# Patient Record
Sex: FEMALE | Race: WHITE | NOT HISPANIC OR LATINO | Employment: OTHER | URBAN - METROPOLITAN AREA
[De-identification: names, ages, dates, MRNs, and addresses within clinical notes are randomized per-mention and may not be internally consistent; named-entity substitution may affect disease eponyms.]

---

## 2024-08-12 ENCOUNTER — HOSPITAL ENCOUNTER (OUTPATIENT)
Dept: CARDIOLOGY | Facility: CLINIC | Age: 79
Discharge: HOME | End: 2024-08-12
Attending: FAMILY MEDICINE | Admitting: FAMILY MEDICINE
Payer: MEDICARE

## 2024-08-12 DIAGNOSIS — I51.89 DIASTOLIC DYSFUNCTION: ICD-10-CM

## 2024-08-12 DIAGNOSIS — I11.9 HYPERTENSIVE HEART DISEASE: ICD-10-CM

## 2024-08-12 LAB — LVEF ECHO: NORMAL

## 2024-08-12 PROCEDURE — 93306 TTE W/DOPPLER COMPLETE: CPT

## 2024-08-12 PROCEDURE — 93306 TTE W/DOPPLER COMPLETE: CPT | Mod: 26 | Performed by: INTERNAL MEDICINE

## 2024-09-13 ENCOUNTER — OFFICE VISIT (OUTPATIENT)
Dept: PODIATRY | Facility: CLINIC | Age: 79
End: 2024-09-13
Payer: MEDICARE

## 2024-09-13 DIAGNOSIS — M77.42 METATARSALGIA OF LEFT FOOT: Primary | ICD-10-CM

## 2024-09-13 PROCEDURE — 99202 OFFICE O/P NEW SF 15 MIN: CPT | Performed by: PODIATRIST

## 2024-09-13 RX ORDER — LEVOTHYROXINE SODIUM 88 UG/1
88 TABLET ORAL DAILY
COMMUNITY
Start: 2024-07-11

## 2024-09-13 RX ORDER — METOPROLOL SUCCINATE 25 MG/1
25 TABLET, EXTENDED RELEASE ORAL AT BEDTIME
COMMUNITY

## 2024-09-13 RX ORDER — EZETIMIBE 10 MG/1
1 TABLET ORAL
COMMUNITY
Start: 2024-07-11

## 2024-09-13 RX ORDER — ASPIRIN 81 MG/1
81 TABLET ORAL DAILY
COMMUNITY

## 2024-09-13 RX ORDER — LOSARTAN POTASSIUM 100 MG/1
100 TABLET ORAL DAILY
COMMUNITY

## 2024-09-13 RX ORDER — FUROSEMIDE 40 MG
40 TABLET ORAL DAILY
COMMUNITY

## 2024-09-13 NOTE — PROGRESS NOTES
Date of Service: 9/13/2024    Chief Complaint:   Chief Complaint   Patient presents with    Consult     Numbness and sensation of bruised, left foot. Hx of a neuroma. Patient relates that this is better and has minor sensitivity.          HPI: Annie is a 79 year old female who presents today for further evaluation of left foot pain.  Nature: dull aching    Location: left 3rd met     Duration: months    Onset: gradual. No trauma    Course: better    Aggravating/alleviating factors: NA    Previous Treatments: Had previously been treated for a left foot neuroma.Had some neuroma pain a few weeks ago. This resolved.      Review of Systems: No n/v/d/f/c/ns/sob/cp    PMH: No past medical history on file.    PSxH: No past surgical history on file.    Allergies: Patient has no known allergies.    SH:   Social History     Socioeconomic History    Marital status:      Spouse name: Not on file    Number of children: Not on file    Years of education: Not on file    Highest education level: Not on file   Occupational History    Not on file   Tobacco Use    Smoking status: Not on file    Smokeless tobacco: Not on file   Substance and Sexual Activity    Alcohol use: Not on file    Drug use: Not on file    Sexual activity: Not on file   Other Topics Concern    Not on file   Social History Narrative    Not on file     Social Determinants of Health     Financial Resource Strain: Not on file   Food Insecurity: Not on file   Transportation Needs: Not on file   Physical Activity: Not on file   Stress: Not on file   Social Connections: Not on file   Interpersonal Safety: Not on file   Housing Stability: Not on file       FH: No family history on file.    Objective:  Data Unavailable Data Unavailable Data Unavailable Data Unavailable Data Unavailable 0 lbs 0 oz    PT and DP pulses are 2/4 bilaterally. CRT is instant. Positive pedal hair.   Gross sensation is intact bilaterally. Negative Angely's click.   Equinus is noted  bilaterally. No pain with active or passive ROM of the ankle, MTJ, 1st ray, or halluces bilaterally,. Some pain with palpation of the left 3rd met head.   Nails normal bilaterally. No open lesions are noted.     Assessment:   Encounter Diagnoses   Name Primary?    Metatarsalgia of left foot Yes         Plan:  - Pt seen and evaluated.  - Pain has improved since she made the appt. If the met pain starts again, try Budin splints OTC.  - Activity as tolerated.  - See again PRN.

## 2024-09-13 NOTE — LETTER
9/13/2024      Annie Manriquez  [address]  [facility] [address]      Dear Colleague,    Thank you for referring your patient, Annie Manriquez, to the [facility]. Please see a copy of my visit note below.    Date of Service: 9/13/2024    Chief Complaint:   Chief Complaint   Patient presents with     Consult     Numbness and sensation of bruised, left foot. Hx of a neuroma. Patient relates that this is better and has minor sensitivity.          HPI: Annie is a 79 year old female who presents today for further evaluation of left foot pain.  Nature: dull aching    Location: left 3rd met     Duration: months    Onset: gradual. No trauma    Course: better    Aggravating/alleviating factors: NA    Previous Treatments: Had previously been treated for a left foot neuroma.Had some neuroma pain a few weeks ago. This resolved.      Review of Systems: No n/v/d/f/c/ns/sob/cp    PMH: No past medical history on file.    PSxH: No past surgical history on file.    Allergies: Patient has no known allergies.    SH:   Social History     Socioeconomic History     Marital status:      Spouse name: Not on file     Number of children: Not on file     Years of education: Not on file     Highest education level: Not on file   Occupational History     Not on file   Tobacco Use     Smoking status: Not on file     Smokeless tobacco: Not on file   Substance and Sexual Activity     Alcohol use: Not on file     Drug use: Not on file     Sexual activity: Not on file   Other Topics Concern     Not on file   Social History Narrative     Not on file     Social Determinants of Health     Financial Resource Strain: Not on file   Food Insecurity: Not on file   Transportation Needs: Not on file   Physical Activity: Not on file   Stress: Not on file   Social Connections: Not on file   Interpersonal Safety: Not on file   Housing Stability: Not on file       FH: No family history on file.    Objective:  Data Unavailable  Data Unavailable Data Unavailable Data Unavailable Data Unavailable 0 lbs 0 oz    PT and DP pulses are 2/4 bilaterally. CRT is instant. Positive pedal hair.   Gross sensation is intact bilaterally. Negative Angely's click.   Equinus is noted bilaterally. No pain with active or passive ROM of the ankle, MTJ, 1st ray, or halluces bilaterally,. Some pain with palpation of the left 3rd met head.   Nails normal bilaterally. No open lesions are noted.     Assessment:   Encounter Diagnoses   Name Primary?     Metatarsalgia of left foot Yes         Plan:  - Pt seen and evaluated.  - Pain has improved since she made the appt. If the met pain starts again, try Budin splints OTC.  - Activity as tolerated.  - See again PRN.             Again, thank you for allowing me to participate in the care of your patient.        Sincerely,        Trevor Mariee DPM